# Patient Record
Sex: MALE | Race: BLACK OR AFRICAN AMERICAN | NOT HISPANIC OR LATINO | Employment: FULL TIME | URBAN - METROPOLITAN AREA
[De-identification: names, ages, dates, MRNs, and addresses within clinical notes are randomized per-mention and may not be internally consistent; named-entity substitution may affect disease eponyms.]

---

## 2019-06-13 ENCOUNTER — RECORDS - HEALTHEAST (OUTPATIENT)
Dept: LAB | Facility: HOSPITAL | Age: 39
End: 2019-06-13

## 2019-06-13 LAB — HBV SURFACE AB SERPL IA-ACNC: POSITIVE M[IU]/ML

## 2019-06-17 LAB
GAMMA INTERFERON BACKGROUND BLD IA-ACNC: 0.94 IU/ML
M TB IFN-G BLD-IMP: POSITIVE
MITOGEN IGNF BCKGRD COR BLD-ACNC: 6.35 IU/ML
MITOGEN IGNF BCKGRD COR BLD-ACNC: 6.36 IU/ML
QTF INTERPRETATION: ABNORMAL
QTF MITOGEN - NIL: 6.77 IU/ML

## 2020-08-27 ENCOUNTER — OFFICE VISIT - HEALTHEAST (OUTPATIENT)
Dept: FAMILY MEDICINE | Facility: CLINIC | Age: 40
End: 2020-08-27

## 2020-08-27 ENCOUNTER — COMMUNICATION - HEALTHEAST (OUTPATIENT)
Dept: FAMILY MEDICINE | Facility: CLINIC | Age: 40
End: 2020-08-27

## 2020-08-27 DIAGNOSIS — M54.50 CHRONIC MIDLINE LOW BACK PAIN, UNSPECIFIED WHETHER SCIATICA PRESENT: ICD-10-CM

## 2020-08-27 DIAGNOSIS — R07.9 CHEST PAIN, UNSPECIFIED TYPE: ICD-10-CM

## 2020-08-27 DIAGNOSIS — G89.29 CHRONIC MIDLINE LOW BACK PAIN, UNSPECIFIED WHETHER SCIATICA PRESENT: ICD-10-CM

## 2020-08-27 DIAGNOSIS — R53.83 FATIGUE, UNSPECIFIED TYPE: ICD-10-CM

## 2020-08-27 DIAGNOSIS — K92.1 BLOOD IN STOOL: ICD-10-CM

## 2020-08-27 DIAGNOSIS — H40.9 GLAUCOMA OF BOTH EYES, UNSPECIFIED GLAUCOMA TYPE: ICD-10-CM

## 2020-08-27 DIAGNOSIS — H40.003 GLAUCOMA SUSPECT, BILATERAL: ICD-10-CM

## 2020-08-27 DIAGNOSIS — R11.0 NAUSEA: ICD-10-CM

## 2020-08-27 DIAGNOSIS — G44.219 EPISODIC TENSION-TYPE HEADACHE, NOT INTRACTABLE: ICD-10-CM

## 2020-08-27 LAB
ALBUMIN SERPL-MCNC: 4.3 G/DL (ref 3.5–5)
ALBUMIN UR-MCNC: NEGATIVE MG/DL
ALP SERPL-CCNC: 83 U/L (ref 45–120)
ALT SERPL W P-5'-P-CCNC: 19 U/L (ref 0–45)
ANION GAP SERPL CALCULATED.3IONS-SCNC: 11 MMOL/L (ref 5–18)
APPEARANCE UR: CLEAR
AST SERPL W P-5'-P-CCNC: 24 U/L (ref 0–40)
BILIRUB SERPL-MCNC: 0.8 MG/DL (ref 0–1)
BILIRUB UR QL STRIP: NEGATIVE
BUN SERPL-MCNC: 14 MG/DL (ref 8–22)
CALCIUM SERPL-MCNC: 10.2 MG/DL (ref 8.5–10.5)
CHLORIDE BLD-SCNC: 101 MMOL/L (ref 98–107)
CO2 SERPL-SCNC: 27 MMOL/L (ref 22–31)
COLOR UR AUTO: YELLOW
CREAT SERPL-MCNC: 1.51 MG/DL (ref 0.7–1.3)
ERYTHROCYTE [DISTWIDTH] IN BLOOD BY AUTOMATED COUNT: 12.8 % (ref 11–14.5)
FERRITIN SERPL-MCNC: 163 NG/ML (ref 27–300)
GFR SERPL CREATININE-BSD FRML MDRD: 51 ML/MIN/1.73M2
GLUCOSE BLD-MCNC: 75 MG/DL (ref 70–125)
GLUCOSE UR STRIP-MCNC: NEGATIVE MG/DL
HCT VFR BLD AUTO: 49.2 % (ref 40–54)
HGB BLD-MCNC: 16.4 G/DL (ref 14–18)
HGB UR QL STRIP: NEGATIVE
IRON SATN MFR SERPL: 24 % (ref 20–50)
IRON SERPL-MCNC: 72 UG/DL (ref 42–175)
KETONES UR STRIP-MCNC: NEGATIVE MG/DL
LEUKOCYTE ESTERASE UR QL STRIP: NEGATIVE
LIPASE SERPL-CCNC: 39 U/L (ref 0–52)
MCH RBC QN AUTO: 28.8 PG (ref 27–34)
MCHC RBC AUTO-ENTMCNC: 33.4 G/DL (ref 32–36)
MCV RBC AUTO: 86 FL (ref 80–100)
NITRATE UR QL: NEGATIVE
PH UR STRIP: 6 [PH] (ref 5–8)
PLATELET # BLD AUTO: 134 THOU/UL (ref 140–440)
PMV BLD AUTO: 9.1 FL (ref 7–10)
POTASSIUM BLD-SCNC: 4.3 MMOL/L (ref 3.5–5)
PROT SERPL-MCNC: 8 G/DL (ref 6–8)
RBC # BLD AUTO: 5.69 MILL/UL (ref 4.4–6.2)
SODIUM SERPL-SCNC: 139 MMOL/L (ref 136–145)
SP GR UR STRIP: 1.02 (ref 1–1.03)
TIBC SERPL-MCNC: 305 UG/DL (ref 313–563)
TRANSFERRIN SERPL-MCNC: 244 MG/DL (ref 212–360)
UROBILINOGEN UR STRIP-ACNC: NORMAL
WBC: 5.4 THOU/UL (ref 4–11)

## 2020-08-27 RX ORDER — BRIMONIDINE TARTRATE 2 MG/ML
SOLUTION/ DROPS OPHTHALMIC
Status: SHIPPED | COMMUNITY
Start: 2020-08-08

## 2020-08-27 ASSESSMENT — MIFFLIN-ST. JEOR: SCORE: 1645.61

## 2020-08-27 NOTE — ASSESSMENT & PLAN NOTE
No history of migraines  Ibuprofen and Tylenol typically help  Stress tends to be a trigger    Does not describe pounding or auras  Does not describe photophobia or phonophobia

## 2020-08-27 NOTE — ASSESSMENT & PLAN NOTE
Blood in stool 2 weeks   Constipated   Now better    Fissure in past     Colon 5 years    Hemorrhoidal tag at 10:00  Visualized fissure

## 2020-08-27 NOTE — ASSESSMENT & PLAN NOTE
Last year  Last few minutes  Can exercise     THis last year once    Nausea  Lost weight unintentional No    Ibuprofen no often    Blood in stool 1x    Bikes, walk and runs     Sleeps OK

## 2020-08-28 LAB — HBA1C MFR BLD: 5.4 %

## 2020-08-30 ENCOUNTER — COMMUNICATION - HEALTHEAST (OUTPATIENT)
Dept: FAMILY MEDICINE | Facility: CLINIC | Age: 40
End: 2020-08-30

## 2020-09-09 ENCOUNTER — COMMUNICATION - HEALTHEAST (OUTPATIENT)
Dept: FAMILY MEDICINE | Facility: CLINIC | Age: 40
End: 2020-09-09

## 2021-02-12 ENCOUNTER — COMMUNICATION - HEALTHEAST (OUTPATIENT)
Dept: INTERNAL MEDICINE | Facility: CLINIC | Age: 41
End: 2021-02-12

## 2021-02-16 ENCOUNTER — OFFICE VISIT - HEALTHEAST (OUTPATIENT)
Dept: FAMILY MEDICINE | Facility: CLINIC | Age: 41
End: 2021-02-16

## 2021-02-16 DIAGNOSIS — M25.552 HIP PAIN, LEFT: ICD-10-CM

## 2021-02-16 DIAGNOSIS — S06.0X0D CONCUSSION WITHOUT LOSS OF CONSCIOUSNESS, SUBSEQUENT ENCOUNTER: ICD-10-CM

## 2021-02-16 DIAGNOSIS — G44.319 ACUTE POST-TRAUMATIC HEADACHE, NOT INTRACTABLE: ICD-10-CM

## 2021-03-09 ENCOUNTER — COMMUNICATION - HEALTHEAST (OUTPATIENT)
Dept: SCHEDULING | Facility: CLINIC | Age: 41
End: 2021-03-09

## 2021-03-09 ENCOUNTER — OFFICE VISIT - HEALTHEAST (OUTPATIENT)
Dept: FAMILY MEDICINE | Facility: CLINIC | Age: 41
End: 2021-03-09

## 2021-03-09 DIAGNOSIS — K64.5 THROMBOSED EXTERNAL HEMORRHOIDS: ICD-10-CM

## 2021-05-27 VITALS
HEART RATE: 86 BPM | TEMPERATURE: 98 F | RESPIRATION RATE: 16 BRPM | DIASTOLIC BLOOD PRESSURE: 74 MMHG | OXYGEN SATURATION: 99 % | SYSTOLIC BLOOD PRESSURE: 152 MMHG

## 2021-06-01 ENCOUNTER — OFFICE VISIT - HEALTHEAST (OUTPATIENT)
Dept: FAMILY MEDICINE | Facility: CLINIC | Age: 41
End: 2021-06-01

## 2021-06-01 DIAGNOSIS — R55 SYNCOPE, UNSPECIFIED SYNCOPE TYPE: ICD-10-CM

## 2021-06-01 DIAGNOSIS — R68.89 EXERCISE INTOLERANCE: ICD-10-CM

## 2021-06-01 NOTE — ASSESSMENT & PLAN NOTE
"Driving to work    Fainting yesterday Am   Went to gym  21 min running   Decided to something  Getting off  >> felt nauseated and dizziness   Went down twice   \"did not remember first episode\"    No Vomiting   + Headache >> No history of Migraine       Sat a little   Heart pounding and heart beat fast   Took me home  [place]     Blood pressure >>   Orthostatic 124 / 77  73  98%                     114 / 66  109   Tylenol     Felt better after eating   Speech OK   \"felt cold\"  ++ sweating     This AM better     Felt some pain in shoulder and neck     Plan   On the treadmill >> Feeling Nauseated and Dizziness before     Obtain an echocardiogram to exclude any structural abnormalities  EKG  Follow-up with rapid access for evaluation and treatment as well as blood work        "

## 2021-06-03 ENCOUNTER — OFFICE VISIT - HEALTHEAST (OUTPATIENT)
Dept: CARDIOLOGY | Facility: CLINIC | Age: 41
End: 2021-06-03

## 2021-06-03 DIAGNOSIS — R55 VASOVAGAL SYNCOPE: ICD-10-CM

## 2021-06-03 ASSESSMENT — MIFFLIN-ST. JEOR: SCORE: 1637.67

## 2021-06-04 VITALS
DIASTOLIC BLOOD PRESSURE: 66 MMHG | OXYGEN SATURATION: 99 % | BODY MASS INDEX: 25.76 KG/M2 | HEIGHT: 68 IN | WEIGHT: 170 LBS | SYSTOLIC BLOOD PRESSURE: 118 MMHG | HEART RATE: 84 BPM

## 2021-06-04 LAB
ATRIAL RATE - MUSE: 80 BPM
DIASTOLIC BLOOD PRESSURE - MUSE: NORMAL
INTERPRETATION ECG - MUSE: NORMAL
P AXIS - MUSE: 63 DEGREES
PR INTERVAL - MUSE: 118 MS
QRS DURATION - MUSE: 68 MS
QT - MUSE: 326 MS
QTC - MUSE: 375 MS
R AXIS - MUSE: -1 DEGREES
SYSTOLIC BLOOD PRESSURE - MUSE: NORMAL
T AXIS - MUSE: -4 DEGREES
VENTRICULAR RATE- MUSE: 80 BPM

## 2021-06-05 VITALS
WEIGHT: 169.9 LBS | DIASTOLIC BLOOD PRESSURE: 62 MMHG | TEMPERATURE: 98.7 F | OXYGEN SATURATION: 99 % | BODY MASS INDEX: 26.22 KG/M2 | SYSTOLIC BLOOD PRESSURE: 118 MMHG | HEART RATE: 81 BPM

## 2021-06-10 NOTE — PROGRESS NOTES
ASSESSMENT & PLAN    Blood in stool  Blood in stool 2 weeks   Constipated   Now better    Fissure in past     Colon 5 years    Hemorrhoidal tag at 10:00  Visualized fissure      Chest pain, unspecified type  Last year  Last few minutes  Can exercise     THis last year once    Nausea  Lost weight unintentional No    Ibuprofen no often    Blood in stool 1x    Bikes, walk and runs     Sleeps OK       Glaucoma suspect, bilateral  Bromonidine     Headache  No history of migraines  Ibuprofen and Tylenol typically help  Stress tends to be a trigger    Does not describe pounding or auras  Does not describe photophobia or phonophobia        Chronic midline low back pain, unspecified whether sciatica present  Low back pain  On and off  No radiation  Is occasionally associated with pain in his ankles        Temo was seen today for annual exam.    Diagnoses and all orders for this visit:    Blood in stool  -     Iron and Transferrin Iron Binding Capacity  -     Ferritin  -     HM2(CBC w/o Differential)    Chest pain, unspecified type  -     JIC RED  -     JIC LAV    Nausea  -     Lipase  -     Comprehensive Metabolic Panel  -     Urinalysis-UC if Indicated    Episodic tension-type headache, not intractable  -     JIC RED  -     JIC LAV    Chronic midline low back pain, unspecified whether sciatica present  -     JIC RED  -     JIC LAV    Glaucoma suspect, bilateral    Glaucoma of both eyes, unspecified glaucoma type    Fatigue, unspecified type  -     Glycosylated Hemoglobin A1c        Patient Instructions   CHest Pain INtermitten    Differential Diagnosis   Heart  Aneurysm   GERD  PUD  Muscle   Stress REaction    Could do an echocardiogram with or without stress echocardiogram to evaluate cardiac function, valvular function, for septal hypertrophy or left ventricular hypertrophy    Blood pressure super reassuring rechecked at 100/60    H pylori could be sampled through an endoscopy or through the stool    We will do simple  "blood work today  Kidney testing  Liver testing  General blood count testing  Iron studies  Urinalysis    To specifically evaluate for gallstones that could be associated with nausea or kidney stones a CT scan would be the preferable test    To look for ulcers or sample for H. pylori and endoscopy would be the best way to look    On a CT scan we would also see your lumbar spine to see if he has any significant arthritis      My preference  Stress echocardiogram to evaluate cardiac function and gross structures of the heart  Make a catalog of symptoms and see if things change or evolve over time    If nausea is persistent I would have you see Dr. Mayank Briones at [facility] and do  a trial of over-the-counter Prilosec 20 mg twice daily until seen by Dr. Briones      If headaches are worsening or prominent feature  As always  2 L of water daily  Ensure that you not overdoing caffeine  Do intentional exercise as well as ensure you are getting adequate sleep    If your bowels are constipated  Increase fluid intake 2 L of water daily and add plants  Eat plenty of fruits and vegetables  Increase fiber intake in the form of plants I like 2 tablespoons of Ayan seed, flaxseed, hemp seed is 3 great sources of fiber      Focused Nutrition:  Eat Clean and Whole Foods  These are single ingredient foods that possess vital nutrients which can touch and affect our health in a positive manner.            Lean Meats Protein and Fat---- Nuts, Eggs,Veggies, Fish and Lean Meats especially fish and poultry. Meat and Eggs in their natural form have No Sugar.  Try to choose good fat from Fish, Nuts, Avocados, Extra Virgin Olive Oil (not cooked) other vegetables for example. Opt for Plant Proteins and Fats over Animal Fats      For High Protein Eat---- Meats, Fish, Lean Meats, Beans, Nuts, and Quinoa/ other Whole grains    Focus on \"Whole Foods\":  You know what the food is by looking at it and comes from a plant, tree, animal or " the sea.  Single source organically grown if possible.    Look for Low Glycemic foods:  Try to Avoid foods with any added sugars and absolutely  no High Fructose Corn Syrup    Lean Meats Protein and Fat: These in their natural form have No Sugar.  Go for the good fat from Fish, Nuts, Avocados, Extra Virgin Olive Oil (not cooked) other vegetables for example. Opt for Plant Proteins and Fats over Animal Fats    Beans are excellent complex carbohydrates with fiber- black, garbanzo, lentil, kidney, lima, Steve, Salas    Simple flours and breads, sigh,  in general are simple sugars, when processed and should be avoided.  However there are beneficial fibers in wholes grains.  So if choosing, go for the 100% whole-grain Grains --Bran, brown rice, Flax----Fiber offsets glycemic affect    Vegetables: most vegetables are low glycemic with the exception of starchy ones:  potatoes, carrots, corn, and beats    Whole Fruits mostly are low glycemic:  try to eat his snacks: Try not to pair with fat.  Insulin burns sugar and stores fat as a job     High glycemic fruits: Dates, Watermelon, Figgs, Apricots, Raisins but in moderation OK    For Snacks go for nuts and seeds unless you are instructed not to do so due to another health condition; please although tempting avoid corn chips, jellybeans, pretzels other processed snacks that usually pair sugar and fat.      Probiotics and Prebiotics support digestion and our immune system!    Foods that support this are, among others:    Pros: Kefir, Kombucha, Miso, Pickled Vegetables, Yuridia Kraut, Yogurt, Tempeh,     Pre: Asparagus, Bananas, Eggplant, Garlic, Honey, Kefir, Leeks, Legumes, Onions, Peas, Whole Grains, and Yogurt    Nutrients support our cellular machinery:  High nutrient food support this.    Rest helps digest.  We need restorative sleep  8- 10 hours.  We should also try to have 10 to 12 hours at some point between meals, e.g. 7 PM to 7 AM     Fiber: Ayan Seed or Flax Seed or  "Hemp Seeds:  2-3 tablespoons daily for fiber and Omega 3s      Move everyday your body and sweat!    Get good 8 to 10 hours of Sleep and Hydrate with Water      If your Triglycerides are High:    Look into a Ketogenic Diet    Always choose --No added Sugar..    Fish Oil 2000 mg Daily and/or Flax or Ayan seeds  Milled 2 tablespoons                            Daily    Ayan seed in [place] Milk over night to make pudding    Nuts Especially walnuts.    Fish especially  [place], Mackerel, Anchovy,Sardine and Herring    Vegetables opt for multiple colors daily  New Goal 3- 5 cups of fruits and                         veggies Daily!!      Fermented Foods Rock!  You can do your own preserving and culturing of good bacteria!      Drink fermented Kombuchaa  Eat Cultured vegetable like Kimchi or Sauerkraut  Apple Cider Vinegar Unfiltered can be added 8 oz fizzy water  Foods:  Beets, Celery, Lemon, Lime, Grapefruit, Cucumber and Carrots    Use Bitter Herbs:  Iliana, Arugala, Cilantro, Turmeric, Dandelion, CUmin, Fennel, Mint, Leeks, Parsley, and Milk THistle    Practice Fastin hours from last meal in evening to first meal in morming    Chlorophyll Rich Foods may help, add to water CHorella or Spirulina    Eat Kale and Broccoli Sprouts --- Sulfurophane rich      Eat Cruciferous Vegetables Daily:    Broccoli, Cauliflower, Kale, Collards, Brussel Sprouts    Eat Lots of Fiber:      Soluble:   Ayan, Flax Hemp, Pumpkin Seeds  Insoluble:  Fruits and Veggies  Best sources of  Chicory Root Ground, Dandelion Root, Asparagus, Leeks and Onion, Bananas ( more green), Sprouted Wheat eg Srinivas Bread , Garlic, [place] Artichokes,)       Lastly we have to think of things that are toxic to our health, which may contribute to poor health and disease.  An apple covered in pesticides has both healthy and toxic components for our system.  The very \"healthy choices\" may be laced with toxins.  So choose foods wisely and discriminatingly.  "     Here are some recommendations about when and when not to choose organic foods.  When in doubt wash!      The group identified the following items on its  Dirty Dozen  list of produce with the most pesticide residue:   1. Strawberries  2. Spinach  3. Nectarines  4. Apples  5. Grapes  6. Peaches  7. Cherries  8. Pears  9. Tomatoes  10. Celery  11. Potatoes  12. Sweet Bell Peppers  Here are the items the EWG identified for its  Clean 15,  which report the least likelihood to contain pesticide residue.  1. Avocados  2. Sweet Corn  3. Pineapples  4. Cabbages  5. Onions  6. Sweet Peas  7. Papayas  8. Asparagus  9. Mangoes  10. Eggplants  11. Honeydews  12. Kiwis  13. Cantaloupes  14. Cauliflower  15. Broccoli          Eat well, Get Good Sleep and Stay Active!    DanL            Return in about 1 month (around 9/27/2020).       Little interest or pleasure in doing things: Not at all  Feeling down, depressed, or hopeless: Not at all    CHIEF COMPLAINT: Temo Vickers had concerns including Annual Exam (headaches, dizziness, stomach pain x1 week-constipation, back pain, bloody stools ).    [place]: 1.............. SUBJECTIVE:  Temo Vickers is a 40 y.o. male had concerns including Annual Exam (headaches, dizziness, stomach pain x1 week-constipation, back pain, bloody stools ).    1. Blood in stool    2. Chest pain, unspecified type    3. Nausea    4. Episodic tension-type headache, not intractable    5. Chronic midline low back pain, unspecified whether sciatica present    6. Glaucoma suspect, bilateral    7. Glaucoma of both eyes, unspecified glaucoma type    8. Fatigue, unspecified type          Allergies not on file                      SOCIAL: He  reports that he has never smoked. He has never used smokeless tobacco. He reports previous alcohol use. He reports that he does not use drugs.    REVIEW OF SYSTEMS:   Family history not pertinent to chief complaint or presenting problem    Review of systems otherwise negative  "as requested from patient, except   Those positive ROS outlined and discussed in [place].      VITALS:  Vitals:    08/27/20 1616   BP: 118/66   Pulse: 84   SpO2: 99%   Weight: 170 lb (77.1 kg)   Height: 5' 8\" (1.727 m)     Wt Readings from Last 3 Encounters:   08/27/20 170 lb (77.1 kg)     Body mass index is 25.85 kg/m .    Physical Exam:      Physical:  General Appearance: Healthy-appearingy.   Head:  No deformity  Eyes: Sclerae white, pupils equal and reactive, extra ocular movements intact pterygium     Ears: Well-positioned, well-formed pinnae; TM pearly white, translucent, no bulging   Nose: Clear, normal mucosa no drainage or crusting  Throat: Lips, tongue, and mucosa are moist, pink and intact; tongue no thrush oral pharynx no injection or lesions  ++ TMJ Click   Neck: Supple, symmetric ROM no nodes   No carotid Buits  Chest: Lungs clear to auscultation, no retractions  Heart: Regular rate & rhythm, S1 S2, no murmur  Abdomen: Soft, non-tender, no masses; umbilical area normal   Pulses: Equal femoral pulses  : No hernia palpable external anal mucosa  10:00 skin tag  Declined rectal exam  Extremities: Well-perfused, warm and dry  Palpable Pulses Bilateral straight leg raise is negative  Good strength flexion-extension the ankle flexion-extension the knee  Distal pulses palpable no lower extremity edema  Neuro: Easily aroused good tone NO tremor   Skin  No Rash           I spent 40  minutes with this patient.  This includes pre-visit, intra-visit and post visit work an evaluation with regards to Temo was seen today for annual exam.    Diagnoses and all orders for this visit:    Blood in stool  -     Iron and Transferrin Iron Binding Capacity  -     Ferritin  -     HM2(CBC w/o Differential)    Chest pain, unspecified type  -     JIC RED  -     JIC LAV    Nausea  -     Lipase  -     Comprehensive Metabolic Panel  -     Urinalysis-UC if Indicated    Episodic tension-type headache, not intractable  -     JIC RED  -  "    JIC LAV    Chronic midline low back pain, unspecified whether sciatica present  -     JIC RED  -     JIC LAV    Glaucoma suspect, bilateral    Glaucoma of both eyes, unspecified glaucoma type    Fatigue, unspecified type  -     Glycosylated Hemoglobin A1c        Cullen Rodriguez MD  [facility] [address] [phone]

## 2021-06-10 NOTE — PATIENT INSTRUCTIONS - HE
CHest Pain INtermitten    Differential Diagnosis   Heart  Aneurysm   GERD  PUD  Muscle   Stress REaction    Could do an echocardiogram with or without stress echocardiogram to evaluate cardiac function, valvular function, for septal hypertrophy or left ventricular hypertrophy    Blood pressure super reassuring rechecked at 100/60    H pylori could be sampled through an endoscopy or through the stool    We will do simple blood work today  Kidney testing  Liver testing  General blood count testing  Iron studies  Urinalysis    To specifically evaluate for gallstones that could be associated with nausea Ultrasound is the best test for kidney stones a CT scan would be the preferable test    To look for ulcers or sample for H. pylori and endoscopy would be the best way to look    On a CT scan we would also see your lumbar spine to see if he has any significant arthritis      My preference  Stress echocardiogram to evaluate cardiac function and gross structures of the heart  Make a catalog of symptoms and see if things change or evolve over time    If nausea is persistent I would have you see Dr. Mayank Briones at [facility] and do  a trial of over-the-counter Prilosec 20 mg twice daily until seen by Dr. Briones      If headaches are worsening or prominent feature  As always  2 L of water daily  Ensure that you not overdoing caffeine  Do intentional exercise as well as ensure you are getting adequate sleep    If your bowels are constipated  Increase fluid intake 2 L of water daily and add plants  Eat plenty of fruits and vegetables  Increase fiber intake in the form of plants I like 2 tablespoons of Ayan seed, flaxseed, hemp seed is 3 great sources of fiber      Focused Nutrition:  Eat Clean and Whole Foods  These are single ingredient foods that possess vital nutrients which can touch and affect our health in a positive manner.            Lean Meats Protein and Fat---- Nuts, Eggs,Veggies, Fish and Lean Meats  "especially fish and poultry. Meat and Eggs in their natural form have No Sugar.  Try to choose good fat from Fish, Nuts, Avocados, Extra Virgin Olive Oil (not cooked) other vegetables for example. Opt for Plant Proteins and Fats over Animal Fats      For High Protein Eat---- Meats, Fish, Lean Meats, Beans, Nuts, and Quinoa/ other Whole grains    Focus on \"Whole Foods\":  You know what the food is by looking at it and comes from a plant, tree, animal or the sea.  Single source organically grown if possible.    Look for Low Glycemic foods:  Try to Avoid foods with any added sugars and absolutely  no High Fructose Corn Syrup    Lean Meats Protein and Fat: These in their natural form have No Sugar.  Go for the good fat from Fish, Nuts, Avocados, Extra Virgin Olive Oil (not cooked) other vegetables for example. Opt for Plant Proteins and Fats over Animal Fats    Beans are excellent complex carbohydrates with fiber- black, garbanzo, lentil, kidney, lima, Steve, Salas    Simple flours and breads, sigh,  in general are simple sugars, when processed and should be avoided.  However there are beneficial fibers in wholes grains.  So if choosing, go for the 100% whole-grain Grains --Bran, brown rice, Flax----Fiber offsets glycemic affect    Vegetables: most vegetables are low glycemic with the exception of starchy ones:  potatoes, carrots, corn, and beats    Whole Fruits mostly are low glycemic:  try to eat his snacks: Try not to pair with fat.  Insulin burns sugar and stores fat as a job     High glycemic fruits: Dates, Watermelon, Figgs, Apricots, Raisins but in moderation OK    For Snacks go for nuts and seeds unless you are instructed not to do so due to another health condition; please although tempting avoid corn chips, jellybeans, pretzels other processed snacks that usually pair sugar and fat.      Probiotics and Prebiotics support digestion and our immune system!    Foods that support this are, among others:    Pros: " Kefir, Kombucha, Miso, Pickled Vegetables, Yuridia Kraut, Yogurt, Tempeh,     Pre: Asparagus, Bananas, Eggplant, Garlic, Honey, Kefir, Leeks, Legumes, Onions, Peas, Whole Grains, and Yogurt    Nutrients support our cellular machinery:  High nutrient food support this.    Rest helps digest.  We need restorative sleep  8- 10 hours.  We should also try to have 10 to 12 hours at some point between meals, e.g. 7 PM to 7 AM     Fiber: Ayan Seed or Flax Seed or Hemp Seeds:  2-3 tablespoons daily for fiber and Omega 3s      Move everyday your body and sweat!    Get good 8 to 10 hours of Sleep and Hydrate with Water      If your Triglycerides are High:    Look into a Ketogenic Diet    Always choose --No added Sugar..    Fish Oil 2000 mg Daily and/or Flax or Ayan seeds  Milled 2 tablespoons                            Daily    Ayan seed in [place] Milk over night to make pudding    Nuts Especially walnuts.    Fish especially  [place], Mackerel, Anchovy,Sardine and Herring    Vegetables opt for multiple colors daily  New Goal 3- 5 cups of fruits and                         veggies Daily!!      Fermented Foods Rock!  You can do your own preserving and culturing of good bacteria!      Drink fermented Kombuchaa  Eat Cultured vegetable like Kimchi or Sauerkraut  Apple Cider Vinegar Unfiltered can be added 8 oz fizzy water  Foods:  Beets, Celery, Lemon, Lime, Grapefruit, Cucumber and Carrots    Use Bitter Herbs:  [place], Arugala, Cilantro, Turmeric, Dandelion, CUmin, Fennel, Mint, Leeks, Parsley, and Milk THistle    Practice Fastin hours from last meal in evening to first meal in morming    Chlorophyll Rich Foods may help, add to water CHorella or Spirulina    Eat Kale and Broccoli Sprouts --- Sulfurophane rich      Eat Cruciferous Vegetables Daily:    Broccoli, Cauliflower, Kale, Collards, Brussel Sprouts    Eat Lots of Fiber:      Soluble:   Ayan, Flax Hemp, Pumpkin Seeds  Insoluble:  Fruits and Veggies  Best sources of  Chicory  "Root Ground, Dandelion Root, Asparagus, Leeks and Onion, Bananas ( more green), Sprouted Wheat eg Srinivas Bread , Garlic, [place] Artichokes,)       Lastly we have to think of things that are toxic to our health, which may contribute to poor health and disease.  An apple covered in pesticides has both healthy and toxic components for our system.  The very \"healthy choices\" may be laced with toxins.  So choose foods wisely and discriminatingly.      Here are some recommendations about when and when not to choose organic foods.  When in doubt wash!      The group identified the following items on its  Dirty Dozen  list of produce with the most pesticide residue:   1. Strawberries  2. Spinach  3. Nectarines  4. Apples  5. Grapes  6. Peaches  7. Cherries  8. Pears  9. Tomatoes  10. Celery  11. Potatoes  12. Sweet Bell Peppers  Here are the items the EWG identified for its  Clean 15,  which report the least likelihood to contain pesticide residue.  1. Avocados  2. Sweet Corn  3. Pineapples  4. Cabbages  5. Onions  6. Sweet Peas  7. Papayas  8. Asparagus  9. Mangoes  10. Eggplants  11. Honeydews  12. Kiwis  13. Cantaloupes  14. Cauliflower  15. Broccoli          Eat well, Get Good Sleep and Stay Active!    DanL        "

## 2021-06-15 NOTE — PATIENT INSTRUCTIONS - HE
Continue to monitor symptoms    Let us know if there is any change in your vision, swallowing, worsening headache, balance changes    Continue Tylenol as needed for headaches  Warm compresses to the neck  Gentle stretching and ease back into activities    We could order a CT scan especially if you are having worsening symptoms to exclude bleeding inside of your head    Close follow-up in the next 2 weeks time

## 2021-06-15 NOTE — TELEPHONE ENCOUNTER
"Spouse Stefani calling with patient. He is in a lot pain with hemmoroids.  Patient reports increased pain since yesterday. Stefani reporting 2 bumps around rectum that are the \"size of pointer finger and top of thumb.\"   Patient has tried sitz bath.  Reporting episode of \"fainting\" due to pain at 330 a.m. today. Patient stating he is able to stand and walk with increased pain today.   Advised to be seen in clinic today.    Transferred to Central Scheduling.    Kerri Fung RN  [facility]    COVID 19 Nurse Triage Plan/Patient Instructions    Please be aware that novel coronavirus (COVID-19) may be circulating in the community. If you develop symptoms such as fever, cough, or SOB or if you have concerns about the presence of another infection including coronavirus (COVID-19), please contact your health care provider or visit  [email or web address].    Disposition/Instructions    In-Person Visit with provider recommended. Reference Visit Selection Guide.    Thank you for taking steps to prevent the spread of this virus.  o Limit your contact with others.  o Wear a simple mask to cover your cough.  o Wash your hands well and often.    Resources    [facility]: About COVID-19: [email or web address]    CDC: What to Do If You're Sick: www.cdc.gov/coronavirus/2019-ncov/about/steps-when-sick.html    CDC: Ending Home Isolation: www.cdc.gov/coronavirus/2019-ncov/hcp/disposition-in-home-patients.html     CDC: Caring for Someone: www.cdc.gov/coronavirus/2019-ncov/if-you-are-sick/care-for-someone.html     [facility]: Interim Guidance for Hospital Discharge to Home: www.health.[facility].mn.us/diseases/coronavirus/hcp/hospdischarge.pdf    [facility] clinical trials (COVID-19 research studies): [email or web address]     Below are the COVID-19 hotlines at the [facility] ([facility]). Interpreters are available.   o For health questions: Call " 837.636.9282 or 1-605.749.4836 (7 a.m. to 7 p.m.)  o For questions about schools and childcare: Call 939-786-1556 or 1-764.764.7566 (7 a.m. to 7 p.m.)     Reason for Disposition    Severe rectal pain    Additional Information    Negative: Sounds like a life-threatening emergency to the triager    Negative: Diarrhea is the main symptom    Negative: Constipation is the main symptom (e.g., pain or discomfort caused by passage of hard BMs)    Negative: Blood in or on bowel movement is the main symptom    Negative: Sexual assault    Negative: Injury to rectum    Negative: Patient sounds very sick or weak to the triager    Protocols used: RECTAL SYMPTOMS-A-[place]
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
## 2021-06-15 NOTE — PATIENT INSTRUCTIONS - HE
1) Increase fluids and fiber in your diet. Best done with leafy greens and fruits that have skins. If you are unable to increase fiber with foods, consider a fiber supplement such as Metamucil or Benefiber.    2) Do not sit on the toilet for a long period of time or bear down while trying to have a bowel movement.  3) Apply Anusol to the outside of the hemorrhoid up to 4 times per day to help with inflammation and itch.   4) Over the counter stool softener or laxatives can be used if constipation relief.  5) Use sitz baths 3-4 times per day. Sit in warm water for 15 min at a time.     Understanding Hemorrhoids  Hemorrhoid tissues are  cushions  of blood vessels that swell slightly during bowel movements. Too much pressure on the anal canal can make these tissues remain enlarged and cause symptoms. This can happen both inside and outside the anal canal.    Parts of the Anal Canal    Internal hemorrhoid tissue is in the upper area of the anal canal.    The rectum is the last several inches of the colon. This is where stool is stored prior to bowel movements.    Anal sphincters are ring-shaped muscles that expand and contract to control the anal opening.    External hemorrhoid tissue lies under the anal skin.    The anus is the passage between the rectum and the outside of the body.  Normal Hemorrhoid Tissue  Hemorrhoid tissues play an important role in helping your body eliminate waste. Food passes from the stomach through the intestines. The waste (stool) then travels through the colon to the rectum. It is stored in the rectum until it s ready to be passed from the anus. During bowel movements, hemorrhoids swell with blood and become slightly larger. This swelling helps protect and cushion the anal canal as stool passes from the body. Once the stool has passed, the tissues stop swelling and return to normal.    Problem Hemorrhoids  Pressure due to straining or other factors can cause hemorrhoid tissues to remain  swollen. When this happens to the hemorrhoid tissues in the anal canal they re called internal hemorrhoids. Swollen tissues around the anal opening are called external hemorrhoids. Depending on the location, your symptoms can differ.    Internal hemorrhoids often occur in clusters around the wall of the anal canal. They are usually painless. But they may prolapse (protrude out of the anus) due to straining or pressure from hard stool. After the bowel movement is over, they may then reduce (return inside the body). Internal hemorrhoids often bleed. They can also discharge mucus.    External hemorrhoids are located at the anal opening, just beneath the skin. These tissues rarely cause problems unless they thrombose (form a blood clot). When this occurs, a hard, bluish lump may appear. A thrombosed hemorrhoid also causes sudden, severe pain. In time, the clot may go away on its own. This sometimes leaves a  skin tag  of tissue stretched by the clot.  Hemorrhoid Symptoms  Hemorrhoid symptoms may include:    Pain or a burning sensation    Bleeding during bowel movements    Protrusion of tissue from the anus    Itching around the anus  Causes of Hemorrhoids  There s no single cause of hemorrhoids. Most often, though, they are caused by too much pressure on the anal canal. This can be due to:    Chronic (ongoing) constipation    Straining during bowel movements    Sitting too long on the toilet    Strenuous exercise or heavy lifting   Pregnancy and childbirth    Aging    Diarrhea     7998-2510 [employer]. [address] [place] [address]. All rights reserved. This information is not intended as a substitute for professional medical care. Always follow your healthcare professional's instructions.

## 2021-06-16 PROBLEM — K60.2 ANAL FISSURE: Status: ACTIVE | Noted: 2020-08-27

## 2021-06-16 PROBLEM — K92.1 BLOOD IN STOOL: Status: ACTIVE | Noted: 2020-08-27

## 2021-06-16 PROBLEM — G89.29 CHRONIC MIDLINE LOW BACK PAIN, UNSPECIFIED WHETHER SCIATICA PRESENT: Status: ACTIVE | Noted: 2020-08-27

## 2021-06-16 PROBLEM — S09.90XA INJURY OF HEAD, INITIAL ENCOUNTER: Status: ACTIVE | Noted: 2021-02-12

## 2021-06-16 PROBLEM — H40.003 GLAUCOMA SUSPECT, BILATERAL: Status: ACTIVE | Noted: 2020-08-27

## 2021-06-16 PROBLEM — M54.50 CHRONIC MIDLINE LOW BACK PAIN, UNSPECIFIED WHETHER SCIATICA PRESENT: Status: ACTIVE | Noted: 2020-08-27
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
## 2021-06-16 PROBLEM — R07.9 CHEST PAIN, UNSPECIFIED TYPE: Status: ACTIVE | Noted: 2020-08-27

## 2021-06-20 NOTE — LETTER
Letter by Cullen Rodriguez MD at      Author: Cullen Rodriguez MD Service: -- Author Type: --    Filed:  Encounter Date: 8/30/2020 Status: (Other)         Temo Vickers  [address] [place] [address]             August 30, 2020         Dear Mr. Vickers,    Below are the results from your recent visit:    Resulted Orders   Lipase   Result Value Ref Range    Lipase 39 0 - 52 U/L   Iron and Transferrin Iron Binding Capacity   Result Value Ref Range    Iron 72 42 - 175 ug/dL    Transferrin 244 212 - 360 mg/dL    Transferrin Saturation, Calculated 24 20 - 50 %    Transferrin IBC, Calculated 305 (L) 313 - 563 ug/dL   Ferritin   Result Value Ref Range    Ferritin 163 27 - 300 ng/mL   HM2(CBC w/o Differential)   Result Value Ref Range    WBC 5.4 4.0 - 11.0 thou/uL    RBC 5.69 4.40 - 6.20 mill/uL    Hemoglobin 16.4 14.0 - 18.0 g/dL    Hematocrit 49.2 40.0 - 54.0 %    MCV 86 80 - 100 fL    MCH 28.8 27.0 - 34.0 pg    MCHC 33.4 32.0 - 36.0 g/dL    RDW 12.8 11.0 - 14.5 %    Platelets 134 (L) 140 - 440 thou/uL    MPV 9.1 7.0 - 10.0 fL   Comprehensive Metabolic Panel   Result Value Ref Range    Sodium 139 136 - 145 mmol/L    Potassium 4.3 3.5 - 5.0 mmol/L    Chloride 101 98 - 107 mmol/L    CO2 27 22 - 31 mmol/L    Anion Gap, Calculation 11 5 - 18 mmol/L    Glucose 75 70 - 125 mg/dL    BUN 14 8 - 22 mg/dL    Creatinine 1.51 (H) 0.70 - 1.30 mg/dL    GFR MDRD Af Amer >60 >60 mL/min/1.73m2    GFR MDRD Non Af Amer 51 (L) >60 mL/min/1.73m2    Bilirubin, Total 0.8 0.0 - 1.0 mg/dL    Calcium 10.2 8.5 - 10.5 mg/dL    Protein, Total 8.0 6.0 - 8.0 g/dL    Albumin 4.3 3.5 - 5.0 g/dL    Alkaline Phosphatase 83 45 - 120 U/L    AST 24 0 - 40 U/L    ALT 19 0 - 45 U/L    Narrative    Fasting Glucose reference range is 70-99 mg/dL per  American Diabetes Association (ADA) guidelines.   Urinalysis-UC if Indicated   Result Value Ref Range    Color, UA Yellow Colorless, Yellow, Straw, Light Yellow    Clarity, UA Clear Clear    Glucose, UA  Negative Negative    Bilirubin, UA Negative Negative    Ketones, UA Negative Negative    Specific Gravity, UA 1.020 1.005 - 1.030    Blood, UA Negative Negative    pH, UA 6.0 5.0 - 8.0    Protein, UA Negative Negative mg/dL    Urobilinogen, UA 0.2 E.U./dL 0.2 E.U./dL, 1.0 E.U./dL    Nitrite, UA Negative Negative    Leukocytes, UA Negative Negative    Narrative    Microscopic not indicated  UC not indicated   Glycosylated Hemoglobin A1c   Result Value Ref Range    Hemoglobin A1c 5.4 <=5.6 %      Comment:      Normal <5.7% Prediabete 5.7-6.4% Diabletes 6.5% or higher - adopted from ADA consensus guidelines       Temo and Stefani that Labs look really OK       Creatinine is 1.5 but may just be more muscle mass turnover.   Intentional Water intake.       Lipase = Pancreas OK   LIver AST/ALT = OK     Iron Levels = OK   NO diabetes   Urine is Spotless  = No evidence of Kidney Stones   CBC = Blood Counts OK    Next Steps   Either:  Heart Test Stress Echo would be the test of Choice = Functional and Structural     Gastrointesinal Ulcer Screen   Stool Test for Parasites and H Pylori  CT scan Checks for Kidneys and Consitpation and General Cancer screen   Trial of Prilosec OTC 20 mg two times a day for 14 days and see if helps   DanL      Please call with questions or contact us using [employer].    Sincerely,        Electronically signed by Cullen Rodriguez MD

## 2021-06-20 NOTE — LETTER
Letter by Cullen Rodriguez MD at      Author: Cullen Rodriguez MD Service: -- Author Type: --    Filed:  Encounter Date: 9/9/2020 Status: (Other)         Temo Vickers  [address] [place] [address]             September 9, 2020         Dear Mr. Vickers,    Below are the results from your recent visit:    Resulted Orders   Lipase   Result Value Ref Range    Lipase 39 0 - 52 U/L   Iron and Transferrin Iron Binding Capacity   Result Value Ref Range    Iron 72 42 - 175 ug/dL    Transferrin 244 212 - 360 mg/dL    Transferrin Saturation, Calculated 24 20 - 50 %    Transferrin IBC, Calculated 305 (L) 313 - 563 ug/dL   Ferritin   Result Value Ref Range    Ferritin 163 27 - 300 ng/mL   HM2(CBC w/o Differential)   Result Value Ref Range    WBC 5.4 4.0 - 11.0 thou/uL    RBC 5.69 4.40 - 6.20 mill/uL    Hemoglobin 16.4 14.0 - 18.0 g/dL    Hematocrit 49.2 40.0 - 54.0 %    MCV 86 80 - 100 fL    MCH 28.8 27.0 - 34.0 pg    MCHC 33.4 32.0 - 36.0 g/dL    RDW 12.8 11.0 - 14.5 %    Platelets 134 (L) 140 - 440 thou/uL    MPV 9.1 7.0 - 10.0 fL   Comprehensive Metabolic Panel   Result Value Ref Range    Sodium 139 136 - 145 mmol/L    Potassium 4.3 3.5 - 5.0 mmol/L    Chloride 101 98 - 107 mmol/L    CO2 27 22 - 31 mmol/L    Anion Gap, Calculation 11 5 - 18 mmol/L    Glucose 75 70 - 125 mg/dL    BUN 14 8 - 22 mg/dL    Creatinine 1.51 (H) 0.70 - 1.30 mg/dL    GFR MDRD Af Amer >60 >60 mL/min/1.73m2    GFR MDRD Non Af Amer 51 (L) >60 mL/min/1.73m2    Bilirubin, Total 0.8 0.0 - 1.0 mg/dL    Calcium 10.2 8.5 - 10.5 mg/dL    Protein, Total 8.0 6.0 - 8.0 g/dL    Albumin 4.3 3.5 - 5.0 g/dL    Alkaline Phosphatase 83 45 - 120 U/L    AST 24 0 - 40 U/L    ALT 19 0 - 45 U/L    Narrative    Fasting Glucose reference range is 70-99 mg/dL per  American Diabetes Association (ADA) guidelines.   Urinalysis-UC if Indicated   Result Value Ref Range    Color, UA Yellow Colorless, Yellow, Straw, Light Yellow    Clarity, UA Clear Clear    Glucose, UA  Negative Negative    Bilirubin, UA Negative Negative    Ketones, UA Negative Negative    Specific Gravity, UA 1.020 1.005 - 1.030    Blood, UA Negative Negative    pH, UA 6.0 5.0 - 8.0    Protein, UA Negative Negative mg/dL    Urobilinogen, UA 0.2 E.U./dL 0.2 E.U./dL, 1.0 E.U./dL    Nitrite, UA Negative Negative    Leukocytes, UA Negative Negative    Narrative    Microscopic not indicated  UC not indicated   Glycosylated Hemoglobin A1c   Result Value Ref Range    Hemoglobin A1c 5.4 <=5.6 %      Comment:      Normal <5.7% Prediabete 5.7-6.4% Diabletes 6.5% or higher - adopted from ADA consensus guidelines       Inform Temo and Stefani that Labs look really OK     Creatinine is 1.5 but may just be more muscle mass turnover.   Intentional Water intake.     Lipase = Pancreas OK   LIver AST/ALT = OK     Iron Levels = OK   NO diabetes   Urine is Spotless  = No evidence of Kidney Stones   CBC = Blood Counts OK     Please call with questions or contact us using [facility].    Sincerely,        Electronically signed by Cullen Rodriguez MD

## 2021-06-20 NOTE — LETTER
Letter by Cullen Rodriguez MD at      Author: Cullen Rodriguez MD Service: -- Author Type: --    Filed:  Encounter Date: 8/27/2020 Status: (Other)         August 27, 2020     Patient: Temo Vickers   YOB: 1980   Date of Visit: 8/27/2020       Triston Plascencia!!    It was really great to meet you today!    Topics:    CHest Pain INtermitten    Differential Diagnosis   Heart  Aneurysm   GERD  PUD  Muscle   Stress REaction    We could do an echocardiogram with or without stress echocardiogram to evaluate cardiac function, valvular function, for septal hypertrophy or left ventricular hypertrophy    Blood pressure super reassuring rechecked at 100/60    H pylori could be sampled through an endoscopy or through the stool--- you bring in Poop.  Parasite testing the same, although diarrhea, bloating and abdominal pain are usually prominent.    We will do simple blood work today  Kidney testing  Liver testing  General blood count testing  Iron studies  Urinalysis  Diabetes Testing      To specifically evaluate for gallstones that could be associated with nausea Ultrasound is the best test  for kidney stones a CT scan would be the preferable test    To look for ulcers or sample for H. pylori and endoscopy would be the best way to look    On a CT scan we would also see your lumbar spine to see if he has any significant arthritis      My preference  Stress echocardiogram to evaluate cardiac function and gross structures of the heart  Make a catalog of symptoms and see if things change or evolve over time    If nausea is persistent I would have you see Dr. Mayank Briones at [facility] and do  a trial of over-the-counter Prilosec 20 mg twice daily until seen by Dr. Briones      If headaches are worsening or prominent feature  As always  2 L of water daily  Ensure that you not overdoing caffeine  Do intentional exercise as well as ensure you are getting adequate sleep    If your bowels are constipated  Increase  "fluid intake 2 L of water daily and add plants  Eat plenty of fruits and vegetables  Increase fiber intake in the form of plants I like 2 tablespoons of Ayan seed, flaxseed, hemp seed is 3 great sources of fiber      Focused Nutrition:  Eat Clean and Whole Foods  These are single ingredient foods that possess vital nutrients which can touch and affect our health in a positive manner.            Lean Meats Protein and Fat---- Nuts, Eggs,Veggies, Fish and Lean Meats especially fish and poultry. Meat and Eggs in their natural form have No Sugar.  Try to choose good fat from Fish, Nuts, Avocados, Extra Virgin Olive Oil (not cooked) other vegetables for example. Opt for Plant Proteins and Fats over Animal Fats      For High Protein Eat---- Meats, Fish, Lean Meats, Beans, Nuts, and Quinoa/ other Whole grains    Focus on \"Whole Foods\":  You know what the food is by looking at it and comes from a plant, tree, animal or the sea.  Single source organically grown if possible.    Look for Low Glycemic foods:  Try to Avoid foods with any added sugars and absolutely  no High Fructose Corn Syrup    Lean Meats Protein and Fat: These in their natural form have No Sugar.  Go for the good fat from Fish, Nuts, Avocados, Extra Virgin Olive Oil (not cooked) other vegetables for example. Opt for Plant Proteins and Fats over Animal Fats    Beans are excellent complex carbohydrates with fiber- black, garbanzo, lentil, kidney, lima, Steve, Salas    Simple flours and breads, sigh,  in general are simple sugars, when processed and should be avoided.  However there are beneficial fibers in wholes grains.  So if choosing, go for the 100% whole-grain Grains --Bran, brown rice, Flax----Fiber offsets glycemic affect    Vegetables: most vegetables are low glycemic with the exception of starchy ones:  potatoes, carrots, corn, and beats    Whole Fruits mostly are low glycemic:  try to eat his snacks: Try not to pair with fat.  Insulin burns sugar and " stores fat as a job     High glycemic fruits: Dates, Watermelon, Figgs, Apricots, Raisins but in moderation OK    For Snacks go for nuts and seeds unless you are instructed not to do so due to another health condition; please although tempting avoid corn chips, jellybeans, pretzels other processed snacks that usually pair sugar and fat.      Probiotics and Prebiotics support digestion and our immune system!    Foods that support this are, among others:    Pros: Kefir, Kombucha, Miso, Pickled Vegetables, Yuridia Kraut, Yogurt, Tempeh,     Pre: Asparagus, Bananas, Eggplant, Garlic, Honey, Kefir, Leeks, Legumes, Onions, Peas, Whole Grains, and Yogurt    Nutrients support our cellular machinery:  High nutrient food support this.    Rest helps digest.  We need restorative sleep  8- 10 hours.  We should also try to have 10 to 12 hours at some point between meals, e.g. 7 PM to 7 AM     Fiber: Ayan Seed or Flax Seed or Hemp Seeds:  2-3 tablespoons daily for fiber and Omega 3s      Move everyday your body and sweat!    Get good 8 to 10 hours of Sleep and Hydrate with Water      If your Triglycerides are High:    Look into a Ketogenic Diet    Always choose --No added Sugar..    Fish Oil 2000 mg Daily and/or Flax or Ayan seeds  Milled 2 tablespoons                            Daily    Ayan seed in Hemlock Milk over night to make pudding    Nuts Especially walnuts.    Fish especially  Vancleave, Mackerel, Anchovy,Sardine and Herring    Vegetables opt for multiple colors daily  New Goal 3- 5 cups of fruits and                         veggies Daily!!      Fermented Foods Rock!  You can do your own preserving and culturing of good bacteria!      Drink fermented Kombuchaa  Eat Cultured vegetable like Kimchi or Sauerkraut  Apple Cider Vinegar Unfiltered can be added 8 oz fizzy water  Foods:  Beets, Celery, Lemon, Lime, Grapefruit, Cucumber and Carrots    Use Bitter Herbs:  Iliana, Arugala, Cilantro, Turmeric, Dandelion, CUmin, Fennel,  "Mint, Leeks, Parsley, and Milk THistle    Practice Fastin hours from last meal in evening to first meal in morming    Chlorophyll Rich Foods may help, add to water CHorella or Spirulina    Eat Kale and Broccoli Sprouts --- Sulfurophane rich      Eat Cruciferous Vegetables Daily:    Broccoli, Cauliflower, Kale, Collards, Brussel Sprouts    Eat Lots of Fiber:      Soluble:   Ayan, Flax Hemp, Pumpkin Seeds  Insoluble:  Fruits and Veggies  Best sources of  Chicory Root Ground, Dandelion Root, Asparagus, Leeks and Onion, Bananas ( more green), Sprouted Wheat eg Srinivas Bread , Garlic, Yorkshire Artichokes,)       Lastly we have to think of things that are toxic to our health, which may contribute to poor health and disease.  An apple covered in pesticides has both healthy and toxic components for our system.  The very \"healthy choices\" may be laced with toxins.  So choose foods wisely and discriminatingly.      Here are some recommendations about when and when not to choose organic foods.  When in doubt wash!      The group identified the following items on its Dirty Dozen list of produce with the most pesticide residue:   1. Strawberries  2. Spinach  3. Nectarines  4. Apples  5. Grapes  6. Peaches  7. Cherries  8. Pears  9. Tomatoes  10. Celery  11. Potatoes  12. Sweet Bell Peppers  Here are the items the EWG identified for its Clean 15, which report the least likelihood to contain pesticide residue.  1. Avocados  2. Sweet Corn  3. Pineapples  4. Cabbages  5. Onions  6. Sweet Peas  7. Papayas  8. Asparagus  9. Mangoes  10. Eggplants  11. Honeydews  12. Kiwis  13. Cantaloupes  14. Cauliflower  15. Broccoli          Eat well, Get Good Sleep and Stay Active!    DanL        Sincerely,         Electronically signed by Cullen Rodriguez MD       "

## 2021-06-21 NOTE — LETTER
Letter by Lucia Tracy PA-C at      Author: Lucia Tracy PA-C Service: -- Author Type: --    Filed:  Encounter Date: 3/9/2021 Status: (Other)         March 9, 2021     Patient: Temo Vickers   YOB: 1980   Date of Visit: 3/9/2021       To Whom It May Concern:    It is my medical opinion that Temo Vickers may return to work on 3/12/2021.    If you have any questions or concerns, please don't hesitate to call.    Sincerely,        Electronically signed by Lucia Tracy PA-C

## 2021-06-25 NOTE — PROGRESS NOTES
"Temo Vickers is a 41 y.o. male who is being evaluated via a billable telephone visit.      What phone number would you like to be contacted at? [phone]  How would you like to obtain your AVS? AVS Preference: Mitesh.    Assessment & Plan   Problem List Items Addressed This Visit     Syncope - Primary     Driving to work    Fainting yesterday Am   Went to gym  21 min running   Decided to something  Getting off  >> felt nauseated and dizziness   Went down twice   \"did not remember first episode\"    No Vomiting   + Headache >> No history of Migraine       Sat a little   Heart pounding and heart beat fast   Took me home  Stefani     Blood pressure >>   Orthostatic 124 / 77  73  98%                     114 / 66  109   Tylenol     Felt better after eating   Speech OK   \"felt cold\"  ++ sweating     This AM better     Felt some pain in shoulder and neck     Plan   On the treadmill >> Feeling Nauseated and Dizziness before     Obtain an echocardiogram to exclude any structural abnormalities  EKG  Follow-up with rapid access for evaluation and treatment as well as blood work               Relevant Orders    Echo Complete    Electrocardiogram Perform and Read (Completed)    Ambulatory referral to [facility]      Other Visit Diagnoses     Exercise intolerance        Relevant Orders    Echo Complete    Electrocardiogram Perform and Read (Completed)    Ambulatory referral to [facility]             Problem List Items Addressed This Visit     Syncope - Primary     Driving to work    Fainting yesterday Am   Went to gym  21 min running   Decided to something  Getting off  >> felt nauseated and dizziness   Went down twice   \"did not remember first episode\"    No Vomiting   + Headache >> No history of Migraine       Sat a little   Heart pounding and heart beat fast   Took me home  Stefani     Blood pressure >>   Orthostatic 124 / 77  73  98%                     114 / 66  109   Tylenol     Felt better after eating " "  Speech OK   \"felt cold\"  ++ sweating     This AM better     Felt some pain in shoulder and neck     Plan   On the treadmill >> Feeling Nauseated and Dizziness before     Obtain an echocardiogram to exclude any structural abnormalities  EKG  Follow-up with rapid access for evaluation and treatment as well as blood work               Relevant Orders    Echo Complete    Electrocardiogram Perform and Read (Completed)    Ambulatory referral to [facility]      Other Visit Diagnoses     Exercise intolerance        Relevant Orders    Echo Complete    Electrocardiogram Perform and Read (Completed)    Ambulatory referral to [facility]            BMI:   Estimated body mass index is 26.22 kg/m  as calculated from the following:    Height as of 2/12/21: 5' 7.5\" (1.715 m).    Weight as of 2/16/21: 169 lb 14.4 oz (77.1 kg).       No follow-ups on file.    Cullen Rodriguez MD  [facility]      Subjective   Temo Vickers is 41 y.o. and presents today for the following health issues   HPI       Review of Systems  Syncope    Nausea   Headache       Objective    Vitals - Patient Reported  Systolic (Patient Reported): 105  Diastolic (Patient Reported): 68  SpO2 (Patient Reported): 98    Physical Exam  none    Phone call duration: 21 minutes    "
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
## 2021-06-26 NOTE — PATIENT INSTRUCTIONS - HE
Thanks for calling in today Temo    It sounds like you had a fainting episode after exercise  This could be related to an abnormal reaction of the sympathetic nervous system  However it is important to rule out any structural abnormalities of your heart and also electrical abnormality of the heart    I would like you to come in and do an EKG  I would like you to have an echocardiogram, which is a structural evaluation of your heart  I would like you to see one of our cardiologists in the rapid access clinic    If these other tests are normal, they may want to put you on a treadmill and to observe what happens in the action of exercise    Please take it light over the next couple of days until you see the cardiologist    If you have any further symptoms the neck step would be to proceed to the emergency room      Genaro WEST

## 2021-06-27 ENCOUNTER — HEALTH MAINTENANCE LETTER (OUTPATIENT)
Age: 41
End: 2021-06-27

## 2021-06-29 ENCOUNTER — HOSPITAL ENCOUNTER (OUTPATIENT)
Dept: CARDIOLOGY | Facility: HOSPITAL | Age: 41
Discharge: HOME | End: 2021-06-29
Attending: INTERNAL MEDICINE
Payer: COMMERCIAL

## 2021-06-29 ENCOUNTER — HOSPITAL ENCOUNTER (OUTPATIENT)
Dept: CARDIOLOGY | Facility: HOSPITAL | Age: 41
Discharge: HOME | End: 2021-06-29
Attending: FAMILY MEDICINE
Payer: COMMERCIAL

## 2021-06-29 DIAGNOSIS — R68.89 EXERCISE INTOLERANCE: ICD-10-CM

## 2021-06-29 DIAGNOSIS — R55 SYNCOPE, UNSPECIFIED SYNCOPE TYPE: ICD-10-CM

## 2021-06-29 DIAGNOSIS — R55 VASOVAGAL SYNCOPE: ICD-10-CM

## 2021-06-29 LAB
AORTIC ROOT: 3.3 CM
AORTIC VALVE MEAN VELOCITY: 85.8 CM/S
ASCENDING AORTA: 3.2 CM
AV DIMENSIONLESS INDEX VTI: 0.9
AV MEAN GRADIENT: 3 MMHG
AV PEAK GRADIENT: 6.7 MMHG
AV VALVE AREA: 2.7 CM2
AV VELOCITY RATIO: 0.8
BSA FOR ECHO PROCEDURE: 1.91 M2
CV BLOOD PRESSURE: ABNORMAL MMHG
CV ECHO HEIGHT: 67.5 IN
CV ECHO WEIGHT: 170 LBS
DOP CALC AO PEAK VEL: 129 CM/S
DOP CALC AO VTI: 25.1 CM
DOP CALC LVOT AREA: 3.14 CM2
DOP CALC LVOT DIAMETER: 2 CM
DOP CALC LVOT PEAK VEL: 109 CM/S
DOP CALC LVOT STROKE VOLUME: 68.8 CM3
DOP CALCLVOT PEAK VEL VTI: 21.9 CM
EJECTION FRACTION: 59 % (ref 55–75)
FRACTIONAL SHORTENING: 30.8 % (ref 28–44)
INTERVENTRICULAR SEPTUM IN END DIASTOLE: 1 CM (ref 0.6–1)
IVS/PW RATIO: 1.3
LA AREA 1: 16.1 CM2
LA AREA 2: 17.1 CM2
LEFT ATRIUM LENGTH: 4.96 CM
LEFT ATRIUM SIZE: 2.8 CM
LEFT ATRIUM TO AORTIC ROOT RATIO: 0.85 NO UNITS
LEFT ATRIUM VOLUME INDEX: 24.7 ML/M2
LEFT ATRIUM VOLUME: 47.2 ML
LEFT VENTRICLE DIASTOLIC VOLUME INDEX: 42.9 CM3/M2 (ref 34–74)
LEFT VENTRICLE DIASTOLIC VOLUME: 82 CM3 (ref 62–150)
LEFT VENTRICLE MASS INDEX: 55.1 G/M2
LEFT VENTRICLE SYSTOLIC VOLUME INDEX: 17.8 CM3/M2 (ref 11–31)
LEFT VENTRICLE SYSTOLIC VOLUME: 34 CM3 (ref 21–61)
LEFT VENTRICULAR INTERNAL DIMENSION IN DIASTOLE: 3.9 CM (ref 4.2–5.8)
LEFT VENTRICULAR INTERNAL DIMENSION IN SYSTOLE: 2.7 CM (ref 2.5–4)
LEFT VENTRICULAR MASS: 105.3 G
LEFT VENTRICULAR OUTFLOW TRACT MEAN GRADIENT: 2 MMHG
LEFT VENTRICULAR OUTFLOW TRACT MEAN VELOCITY: 71.7 CM/S
LEFT VENTRICULAR POSTERIOR WALL IN END DIASTOLE: 0.8 CM (ref 0.6–1)
LV STROKE VOLUME INDEX: 36 ML/M2
MITRAL VALVE E/A RATIO: 1.4
MV AVERAGE E/E' RATIO: 5.3 CM/S
MV DECELERATION TIME: 261 MS
MV E'TISSUE VEL-LAT: 14.3 CM/S
MV E'TISSUE VEL-MED: 8.97 CM/S
MV LATERAL E/E' RATIO: 4.3
MV MEDIAL E/E' RATIO: 6.9
MV PEAK A VELOCITY: 44.4 CM/S
MV PEAK E VELOCITY: 62.2 CM/S
NUC REST DIASTOLIC VOLUME INDEX: 2720 LBS
NUC REST SYSTOLIC VOLUME INDEX: 67.5 IN
TRICUSPID VALVE ANULAR PLANE SYSTOLIC EXCURSION: 2 CM

## 2021-06-29 ASSESSMENT — MIFFLIN-ST. JEOR: SCORE: 1637.67

## 2021-07-04 NOTE — ADDENDUM NOTE
Addendum Note by Kanu Noel MD at 6/3/2021  9:50 AM     Author: Kanu Noel MD Service: -- Author Type: Physician    Filed: 6/3/2021 11:00 AM Encounter Date: 6/3/2021 Status: Signed    : Kanu Noel MD (Physician)    Addended by: KANU NOEL on: 6/3/2021 11:00 AM        Modules accepted: Orders

## 2021-07-06 VITALS
BODY MASS INDEX: 25.76 KG/M2 | HEART RATE: 84 BPM | SYSTOLIC BLOOD PRESSURE: 96 MMHG | DIASTOLIC BLOOD PRESSURE: 56 MMHG | HEIGHT: 68 IN | WEIGHT: 170 LBS | RESPIRATION RATE: 16 BRPM

## 2021-07-06 VITALS — HEIGHT: 68 IN | BODY MASS INDEX: 25.76 KG/M2 | WEIGHT: 170 LBS

## 2021-10-17 ENCOUNTER — HEALTH MAINTENANCE LETTER (OUTPATIENT)
Age: 41
End: 2021-10-17

## 2021-10-21 ENCOUNTER — IMMUNIZATION (OUTPATIENT)
Dept: FAMILY MEDICINE | Facility: CLINIC | Age: 41
End: 2021-10-21
Payer: COMMERCIAL

## 2021-10-21 PROCEDURE — 90471 IMMUNIZATION ADMIN: CPT

## 2021-10-21 PROCEDURE — 90686 IIV4 VACC NO PRSV 0.5 ML IM: CPT

## 2021-12-08 ENCOUNTER — IMMUNIZATION (OUTPATIENT)
Dept: NURSING | Facility: CLINIC | Age: 41
End: 2021-12-08
Payer: COMMERCIAL

## 2021-12-08 PROCEDURE — 91306 COVID-19,PF,MODERNA (18+ YRS BOOSTER .25ML): CPT

## 2021-12-08 PROCEDURE — 0064A COVID-19,PF,MODERNA (18+ YRS BOOSTER .25ML): CPT

## 2021-12-10 DIAGNOSIS — Z20.9 EXPOSURE TO POTENTIAL INFECTION: ICD-10-CM

## 2021-12-10 DIAGNOSIS — Z20.822 EXPOSURE TO 2019 NOVEL CORONAVIRUS: Primary | ICD-10-CM

## 2021-12-10 PROCEDURE — 90691 TYPHOID VACCINE IM: CPT | Performed by: FAMILY MEDICINE

## 2021-12-10 RX ORDER — MEFLOQUINE HYDROCHLORIDE 250 MG/1
250 TABLET ORAL
Qty: 7 TABLET | Refills: 0 | Status: SHIPPED | OUTPATIENT
Start: 2021-12-10 | End: 2022-01-22

## 2021-12-13 ENCOUNTER — NURSE TRIAGE (OUTPATIENT)
Dept: NURSING | Facility: CLINIC | Age: 41
End: 2021-12-13
Payer: COMMERCIAL

## 2021-12-13 NOTE — TELEPHONE ENCOUNTER
Patient is calling and states that he just received covid vaccine the booster of Moderna and is wanting to know if he can get a Tetanus shot and Hepatitis A vaccine and is wondering how long he should wait.  Patient is suppose to get vaccinated tomorrow with tetanus and Hep A.  Patient is requesting to speak with Cullen Espinoza.  Please phone Temo today within 2 hours at [phone].  Patient is traveling Internationally Jan 4th to Jan 26.      COVID 19 Nurse Triage Plan/Patient Instructions    Please be aware that novel coronavirus (COVID-19) may be circulating in the community. If you develop symptoms such as fever, cough, or SOB or if you have concerns about the presence of another infection including coronavirus (COVID-19), please contact your health care provider or visit [email or web address].     Disposition/Instructions    Home care recommended. Follow home care protocol based instructions.    Thank you for taking steps to prevent the spread of this virus.  o Limit your contact with others.  o Wear a simple mask to cover your cough.  o Wash your hands well and often.    Resources    [facility]: About COVID-19: [email or web address]    CDC: What to Do If You're Sick: www.cdc.gov/coronavirus/2019-ncov/about/steps-when-sick.html    CDC: Ending Home Isolation: www.cdc.gov/coronavirus/2019-ncov/hcp/disposition-in-home-patients.html     CDC: Caring for Someone: www.cdc.gov/coronavirus/2019-ncov/if-you-are-sick/care-for-someone.html     [facility]: Interim Guidance for Hospital Discharge to Home: www.health.[facility].mn.us/diseases/coronavirus/hcp/hospdischarge.pdf    [facility] clinical trials (COVID-19 research studies): [email or web address]/[facility]-clinical-trials     Below are the COVID-19 hotlines at the [place] Department of Health ([facility]). Interpreters are available.   o For health questions: Call 692-349-8091 or 1-396.985.6142 (7 a.m. to 7 p.m.)  o For questions about  schools and childcare: Call 466-455-2482 or 1-731.857.6935 (7 a.m. to 7 p.m.)

## 2021-12-18 PROCEDURE — 90471 IMMUNIZATION ADMIN: CPT | Performed by: FAMILY MEDICINE

## 2021-12-18 PROCEDURE — 90691 TYPHOID VACCINE IM: CPT | Performed by: FAMILY MEDICINE

## 2021-12-28 ENCOUNTER — ALLIED HEALTH/NURSE VISIT (OUTPATIENT)
Dept: FAMILY MEDICINE | Facility: CLINIC | Age: 41
End: 2021-12-28
Payer: COMMERCIAL

## 2021-12-28 DIAGNOSIS — Z23 IMMUNIZATION DUE: Primary | ICD-10-CM

## 2021-12-28 PROCEDURE — 99207 PR NO CHARGE LOS: CPT | Performed by: FAMILY MEDICINE

## 2021-12-28 PROCEDURE — 90632 HEPA VACCINE ADULT IM: CPT | Performed by: FAMILY MEDICINE

## 2021-12-28 PROCEDURE — 90471 IMMUNIZATION ADMIN: CPT | Performed by: FAMILY MEDICINE

## 2022-03-31 ENCOUNTER — OFFICE VISIT (OUTPATIENT)
Dept: FAMILY MEDICINE | Facility: CLINIC | Age: 42
End: 2022-03-31
Payer: COMMERCIAL

## 2022-03-31 VITALS
OXYGEN SATURATION: 99 % | HEART RATE: 75 BPM | DIASTOLIC BLOOD PRESSURE: 60 MMHG | HEIGHT: 68 IN | WEIGHT: 173.44 LBS | BODY MASS INDEX: 26.29 KG/M2 | SYSTOLIC BLOOD PRESSURE: 104 MMHG | TEMPERATURE: 98.3 F

## 2022-03-31 DIAGNOSIS — M75.81 RIGHT ROTATOR CUFF TENDONITIS: Primary | ICD-10-CM

## 2022-03-31 DIAGNOSIS — Z13.220 SCREENING FOR HYPERLIPIDEMIA: ICD-10-CM

## 2022-03-31 PROCEDURE — 99213 OFFICE O/P EST LOW 20 MIN: CPT | Performed by: FAMILY MEDICINE

## 2022-03-31 NOTE — PROGRESS NOTES
ASSESSMENT and plan   1. Screening for HIV (human immunodeficiency virus)    He will address this at his next visit    2. Need for hepatitis C screening test  Patient is not interested in a hep C test today    3. Screening for hyperlipidemia    He is not fasting so we will do this at his annual physical    4. Right rotator cuff tendonitis    We discussed treatment options he would like to go to physical therapy he will try continuing with an ice pack.  He does not want to use any systemic medications and would prefer a gel/suggested using Biofreeze.  If his pain increases he understands he needs to contact me  - Physical Therapy Referral; Future        Patient Instructions   It was a pleasure to meet you today    You have a problem with your right rotator cuff around the deltoid muscle.  I am referring you to physical therapy    I would also like you to take Biofreeze which is an over-the-counter preparation it works very well for muscular pain you can use this 2-3 times per day      If the Biofreeze or ice does not work and the physical therapy is making your pain worse please reach out to me and I can prescribe a medication for you      Patient Education     Understanding Rotator Cuff Tendonitis    The rotator cuff is a group of 4 muscles and tendons in the shoulder. Tendons are tough tissues that connect muscles to bone. The 4 muscles and their tendons form a  cuff  around the head of the upper arm bone. The rotator cuff connects the upper arm to the shoulder blade. It keeps the shoulder joint stable and gives it strength. It also helps the shoulder joint with certain movements. These include reaching the arm over the head and rotating the arm.  If tendons are injured or strained, they may get irritated and swollen (inflamed). This is called tendonitis. Rotator cuff tendonitis may cause shoulder pain. It may make it hard to move your shoulder.  What causes rotator cuff tendonitis?  When the rotator cuff tendons  are injured or overworked it causes tendonitis. The most common cause of injury is repetitive overhead activities. These can be work-related activities such as reaching, pushing, or lifting. Or they can be sports-related activities such as throwing, swimming, or lifting weights.  Symptoms of rotator cuff tendonitis  Pain on the side of the upper arm at the shoulder is the most common symptom. Pain may get worse with overhead movements. Or it can get worse when you raise the arm above shoulder level. It may also hurt to lie on the shoulder at night.  Treatment for rotator cuff tendonitis  Treatment may include:    Active rest. This lets the rotator cuff heal. Active rest means using your arm and shoulder, but not doing activities that cause pain. These might be reaching overhead or sleeping on the shoulder.    Cold packs. Putting ice packs on the shoulder helps reduce swelling and ease pain.    Medicines. Prescription or over-the-counter medicines can help ease pain and swelling. NSAIDs (nonsteroidal anti-inflammatory drugs) are the most common medicines used. They may be taken as pills. Or they may be put on the skin as a gel, cream, or patch.    Arm and shoulder exercises. These help keep the shoulder joint moving as it heals. They also help improve the strength of muscles around the joint.  Possible complications  You may be tempted to stop using your shoulder completely to prevent pain. But doing so may lead to a condition called frozen shoulder. To help prevent this, follow instructions you are given for active rest and for doing exercises to help your shoulder heal.  When to call your healthcare provider  Call your healthcare provider right away if you have any of these:    Fever of 100.4 F (38 C) or higher, or as advised by your healthcare provider    Chills    Symptoms that don t get better, or get worse    New symptoms  Ryan last reviewed this educational content on 6/1/2019 2000-2021 [employer]  [employer]. All rights reserved. This information is not intended as a substitute for professional medical care. Always follow your healthcare professional's instructions.               Orders Placed This Encounter   Procedures     Physical Therapy Referral     There are no discontinued medications.    Return in about 6 months (around 9/30/2022) for Routine preventive.    CHIEF COMPLAINT:  chief complaint right shoulder discomfort for 3 months    HISTORY OF PRESENT ILLNESS:  Temo is a 41 year old male who is here because he has had right shoulder pain for about 3 months the pain started gradually he noticed when he was doing any heavy lifting or exercise that he have a twinge of pain on the top of his right shoulder there was no radiation of pain he denies any injuries.  He stopped his exercise protocol and just runs now.  He notes that when he does any weight lifting or overhand pulling he has a discomfort he cannot sleep on the right side.  He is never had any injuries to the right shoulder before.  Denies any numbness to the area.  He is right-hand dominant but denies any weakness to the right forearm.    REVIEW OF SYSTEMS:     Other than mentioned in HPI 10 point review of  All other systems are negative.    PFSH:    Social history reviewed he works for [employer]    TOBACCO USE:  History   Smoking Status     Never Smoker   Smokeless Tobacco     Never Used       VITALS:  Vitals:    03/31/22 0914   BP: 104/60   Pulse: 75   Temp: 98.3  F (36.8  C)   TempSrc: Oral   SpO2: 99%   Weight: 78.7 kg (173 lb 7 oz)   Height: 1.715 m (5' 7.5\")     Wt Readings from Last 3 Encounters:   03/31/22 78.7 kg (173 lb 7 oz)   06/03/21 77.1 kg (170 lb)   02/16/21 77.1 kg (169 lb 14.4 oz)       PHYSICAL EXAM:    Interactive well-built male sitting comfortably in exam room no acute distress  HEENT neck supple mucous members moist with no lymph enlargement noted in the neck  Musculoskeletal system tenderness elicited on the anterior " portion of the right deltoid muscle.  He has no tenderness over the right rhomboid, trapezius or suprascapular muscle.  Neer's impingement test is positive.  He has no discomfort with cardinal movements of the shoulder including external/internal rotation and circumduction.  Neuro power of his right trapezius, serratus interior, biceps triceps and deltoid muscle is 5 out of 5.    DATA REVIEWED:  Additional History from Old Records Summarized (2): 0  Decision to Obtain Records (1): 0  Radiology Tests Summarized or Ordered (1): 0  Labs Reviewed or Ordered (1): 0  Medicine Test Summarized or Ordered (1): 0  Independent Review of EKG or X-RAY(2 each): 0    The visit lasted a total of 21 minutes .    MEDICATIONS:  Current Outpatient Medications   Medication Sig Dispense Refill     brimonidine (ALPHAGAN) 0.2 % ophthalmic solution [BRIMONIDINE (ALPHAGAN) 0.2 % OPHTHALMIC SOLUTION]

## 2022-03-31 NOTE — PATIENT INSTRUCTIONS
It was a pleasure to meet you today    You have a problem with your right rotator cuff around the deltoid muscle.  I am referring you to physical therapy    I would also like you to take Biofreeze which is an over-the-counter preparation it works very well for muscular pain you can use this 2-3 times per day      If the Biofreeze or ice does not work and the physical therapy is making your pain worse please reach out to me and I can prescribe a medication for you      Patient Education     Understanding Rotator Cuff Tendonitis    The rotator cuff is a group of 4 muscles and tendons in the shoulder. Tendons are tough tissues that connect muscles to bone. The 4 muscles and their tendons form a  cuff  around the head of the upper arm bone. The rotator cuff connects the upper arm to the shoulder blade. It keeps the shoulder joint stable and gives it strength. It also helps the shoulder joint with certain movements. These include reaching the arm over the head and rotating the arm.  If tendons are injured or strained, they may get irritated and swollen (inflamed). This is called tendonitis. Rotator cuff tendonitis may cause shoulder pain. It may make it hard to move your shoulder.  What causes rotator cuff tendonitis?  When the rotator cuff tendons are injured or overworked it causes tendonitis. The most common cause of injury is repetitive overhead activities. These can be work-related activities such as reaching, pushing, or lifting. Or they can be sports-related activities such as throwing, swimming, or lifting weights.  Symptoms of rotator cuff tendonitis  Pain on the side of the upper arm at the shoulder is the most common symptom. Pain may get worse with overhead movements. Or it can get worse when you raise the arm above shoulder level. It may also hurt to lie on the shoulder at night.  Treatment for rotator cuff tendonitis  Treatment may include:    Active rest. This lets the rotator cuff heal. Active rest means  using your arm and shoulder, but not doing activities that cause pain. These might be reaching overhead or sleeping on the shoulder.    Cold packs. Putting ice packs on the shoulder helps reduce swelling and ease pain.    Medicines. Prescription or over-the-counter medicines can help ease pain and swelling. NSAIDs (nonsteroidal anti-inflammatory drugs) are the most common medicines used. They may be taken as pills. Or they may be put on the skin as a gel, cream, or patch.    Arm and shoulder exercises. These help keep the shoulder joint moving as it heals. They also help improve the strength of muscles around the joint.  Possible complications  You may be tempted to stop using your shoulder completely to prevent pain. But doing so may lead to a condition called frozen shoulder. To help prevent this, follow instructions you are given for active rest and for doing exercises to help your shoulder heal.  When to call your healthcare provider  Call your healthcare provider right away if you have any of these:    Fever of 100.4 F (38 C) or higher, or as advised by your healthcare provider    Chills    Symptoms that don t get better, or get worse    New symptoms  Ryan last reviewed this educational content on 6/1/2019 2000-2021 [employer]. All rights reserved. This information is not intended as a substitute for professional medical care. Always follow your healthcare professional's instructions.

## 2022-04-01 ENCOUNTER — MYC MEDICAL ADVICE (OUTPATIENT)
Dept: FAMILY MEDICINE | Facility: CLINIC | Age: 42
End: 2022-04-01
Payer: COMMERCIAL

## 2022-10-02 ENCOUNTER — HEALTH MAINTENANCE LETTER (OUTPATIENT)
Age: 42
End: 2022-10-02

## 2023-01-17 ENCOUNTER — LAB REQUISITION (OUTPATIENT)
Dept: LAB | Facility: CLINIC | Age: 43
End: 2023-01-17
Payer: COMMERCIAL

## 2023-01-17 DIAGNOSIS — R30.0 DYSURIA: ICD-10-CM

## 2023-01-17 DIAGNOSIS — Z13.1 ENCOUNTER FOR SCREENING FOR DIABETES MELLITUS: ICD-10-CM

## 2023-01-17 DIAGNOSIS — Z12.5 ENCOUNTER FOR SCREENING FOR MALIGNANT NEOPLASM OF PROSTATE: ICD-10-CM

## 2023-01-17 DIAGNOSIS — Z13.220 ENCOUNTER FOR SCREENING FOR LIPOID DISORDERS: ICD-10-CM

## 2023-01-17 PROCEDURE — 80053 COMPREHEN METABOLIC PANEL: CPT | Mod: ORL | Performed by: FAMILY MEDICINE

## 2023-01-17 PROCEDURE — 80061 LIPID PANEL: CPT | Mod: ORL | Performed by: FAMILY MEDICINE

## 2023-01-17 PROCEDURE — G0103 PSA SCREENING: HCPCS | Mod: ORL | Performed by: FAMILY MEDICINE

## 2023-01-17 PROCEDURE — 87086 URINE CULTURE/COLONY COUNT: CPT | Mod: ORL | Performed by: FAMILY MEDICINE

## 2023-01-18 ENCOUNTER — TRANSCRIBE ORDERS (OUTPATIENT)
Dept: OTHER | Age: 43
End: 2023-01-18

## 2023-01-18 DIAGNOSIS — M25.511 PAIN IN RIGHT SHOULDER: Primary | ICD-10-CM

## 2023-01-18 LAB
ALBUMIN SERPL BCG-MCNC: 4.7 G/DL (ref 3.5–5.2)
ALP SERPL-CCNC: 78 U/L (ref 40–129)
ALT SERPL W P-5'-P-CCNC: 33 U/L (ref 10–50)
ANION GAP SERPL CALCULATED.3IONS-SCNC: 18 MMOL/L (ref 7–15)
AST SERPL W P-5'-P-CCNC: 50 U/L (ref 10–50)
BILIRUB SERPL-MCNC: 0.6 MG/DL
BUN SERPL-MCNC: 12.7 MG/DL (ref 6–20)
CALCIUM SERPL-MCNC: 10.5 MG/DL (ref 8.6–10)
CHLORIDE SERPL-SCNC: 99 MMOL/L (ref 98–107)
CHOLEST SERPL-MCNC: 203 MG/DL
CREAT SERPL-MCNC: 1.21 MG/DL (ref 0.67–1.17)
DEPRECATED HCO3 PLAS-SCNC: 23 MMOL/L (ref 22–29)
GFR SERPL CREATININE-BSD FRML MDRD: 76 ML/MIN/1.73M2
GLUCOSE SERPL-MCNC: 84 MG/DL (ref 70–99)
HDLC SERPL-MCNC: 62 MG/DL
LDLC SERPL CALC-MCNC: 131 MG/DL
NONHDLC SERPL-MCNC: 141 MG/DL
POTASSIUM SERPL-SCNC: 4.3 MMOL/L (ref 3.4–5.3)
PROT SERPL-MCNC: 7.2 G/DL (ref 6.4–8.3)
PSA SERPL-MCNC: 1.61 NG/ML (ref 0–2.5)
SODIUM SERPL-SCNC: 140 MMOL/L (ref 136–145)
TRIGL SERPL-MCNC: 50 MG/DL

## 2023-01-19 LAB — BACTERIA UR CULT: NO GROWTH

## 2023-02-03 ENCOUNTER — THERAPY VISIT (OUTPATIENT)
Dept: PHYSICAL THERAPY | Facility: CLINIC | Age: 43
End: 2023-02-03
Attending: FAMILY MEDICINE
Payer: COMMERCIAL

## 2023-02-03 DIAGNOSIS — M25.511 PAIN IN RIGHT SHOULDER: ICD-10-CM

## 2023-02-03 PROCEDURE — 97161 PT EVAL LOW COMPLEX 20 MIN: CPT | Mod: GP | Performed by: PHYSICAL THERAPIST

## 2023-02-03 PROCEDURE — 97110 THERAPEUTIC EXERCISES: CPT | Mod: GP | Performed by: PHYSICAL THERAPIST

## 2023-02-03 NOTE — PROGRESS NOTES
KEY PT FINDINGS:  1) Intermittent, episodic shoulder pain  2) Limited thoracic ROM, reduced muscular flexibility  3) Poor posterior shoulder activation    Physical Therapy Initial Evaluation: Subjective History     Injury/Condition Details:  Presenting Complaint Right shoulder pain, right hand dominant   Onset Timing/Date December 2021   Mechanism Unknown injury, no mechanism, no trauma recalled.     Woke up with the morning with pain one day. Doesn't know what he did to his shoulder.     Has tried exercises and stretching, painful, ice packs are helpful.      Symptom Behavior Details    Primary Symptoms Sporadic symptoms; Activity/position dependent, pain (Location: anterior shoulder mostly, can be top of shoulder or posterior, Quality: Aching/Throbbing), stiffness  No mechanical symptoms.    Worst Pain 6/10 (with See below)   Symptom Provocators Pushing, pulling occasionally. It isn't consistent as he can do work outs sometimes and sometimes it can cause pain.     No pain with lifting, putting on jackets or laying on his side. Can do all his normal activities.    Best Pain 0/10    Symptom Relievers Ice   Time of day dependent? No   Recent symptom change? symptoms improving     Prior Testing/Intervention for current condition:  Prior Tests None   Prior Treatment none     Lifestyle & General Medical History:  Employment NST/NA at [employer]   Usual physical activities  (within past year) Work outs 3-4x/day   Orthopaedic history See Epic Chart   Notable medical history See Epic Chart   Patient Reported Health good         Physical Therapy Initial Evaluation: Objective Testing  SHOULDER:    Cervical Screen: Normal, no shoulder provocation    Posture: Forward shoulder, forward head. Well developed anterior shoulder musculature    Shoulder: full motion, lacking end range FE on right compared to left Will measure 90/90 next visit   ROM L ROM R MMT/Resisted Testing L MMT/Resisted Testing R   Flexion    -    Abduction    +   IR    +   ER    -   Mid Trap MMT: 4/5  Low Trap MMT: 4-/5 - difficulty raising arm up, (lack of thoracic extension + lack of lower trap strength)      Special Tests:   L R   Impingement     Neer's  -   Hawkin's-Go  +   Posterior/Internal Impingement     AC Joint     Shear     [place]'s (ACJ/superficial pain)     Labral     [place]'s (deep pain)     Instability     Apprehension-Relocation (anterior)     Anterior Load & Shift     Posterior Load & Shift     Multi-Directional Instability      Sulcus     Biceps      Speed's     Rotator Cuff Tear     Drop Arm  -   Lift off   -     Lack of thoracic rotation noted bilaterally, lack of thoracic extension noted.     Assessment/Plan:  Temo presents to physical therapy with complaints of right shoulder pain without trauma, without injury. Primary findings include lack of scapular strength as well as lack of thoracic rotation. He had pain with resisted RC testing into abduction and internal rotation. No gross loss of glenohumeral joint range of motion noted. He reports working out several times per day, all apparently anterior upper body. Recommended modifications to his current exercise plan to improve symptoms as well as implemented physical therapy program.     Patient is a 42 year old male with right side shoulder complaints.    Patient has the following significant findings with corresponding treatment plan.                Diagnosis 1:  Right shoulder pain  Pain -  hot/cold therapy, manual therapy, self management and education  Decreased ROM/flexibility - manual therapy, therapeutic exercise and home program  Decreased joint mobility - manual therapy, therapeutic exercise and home program  Decreased strength - therapeutic exercise, therapeutic activities and home program  Impaired muscle performance - neuro re-education and home program  Decreased function - therapeutic activities and home program    Therapy Evaluation Codes:   1) History comprised  of:   Personal factors that impact the plan of care:      None.    Comorbidity factors that impact the plan of care are:      None.     Medications impacting care: None.  2) Examination of Body Systems comprised of:   Body structures and functions that impact the plan of care:      Shoulder.   Activity limitations that impact the plan of care are:      Lifting.  3) Clinical presentation characteristics are:   Stable/Uncomplicated.  4) Decision-Making    Low complexity using standardized patient assessment instrument and/or measureable assessment of functional outcome.  Cumulative Therapy Evaluation is: Low complexity.    Previous and current functional limitations:  (See Goal Flow Sheet for this information)    Short term and Long term goals: (See Goal Flow Sheet for this information)     Communication ability:  Patient appears to be able to clearly communicate and understand verbal and written communication and follow directions correctly.  Treatment Explanation - The following has been discussed with the patient:   RX ordered/plan of care  Anticipated outcomes  Possible risks and side effects  This patient would benefit from PT intervention to resume normal activities.   Rehab potential is good.    Frequency:  1 X week, once daily  Duration:  for 6 weeks  Discharge Plan:  Achieve all LTG.  Independent in home treatment program.  Reach maximal therapeutic benefit.    Please refer to the daily flowsheet for treatment today, total treatment time and time spent performing 1:1 timed codes.

## 2023-02-03 NOTE — LETTER
[facility]  [address] [place] [address]  [phone]    2023    Re: Temo Vickers   :   1980  MRN:  [id number]   REFERRING PHYSICIAN:   Cullen CONROY [facility]  Date of Initial Evaluation: 2/3/23  Visits:  Rxs Used: 1  Reason for Referral:  Pain in right shoulder    EVALUATION SUMMARY    KEY PT FINDINGS:  1) Intermittent, episodic shoulder pain  2) Limited thoracic ROM, reduced muscular flexibility  3) Poor posterior shoulder activation    Physical Therapy Initial Evaluation: Subjective History     Injury/Condition Details:  Presenting Complaint Right shoulder pain, right hand dominant   Onset Timing/Date 2021   Mechanism Unknown injury, no mechanism, no trauma recalled.     Woke up with the morning with pain one day. Doesn't know what he did to his shoulder.     Has tried exercises and stretching, painful, ice packs are helpful.      Symptom Behavior Details    Primary Symptoms Sporadic symptoms; Activity/position dependent, pain (Location: anterior shoulder mostly, can be top of shoulder or posterior, Quality: Aching/Throbbing), stiffness  No mechanical symptoms.    Worst Pain 6/10 (with See below)   Symptom Provocators Pushing, pulling occasionally. It isn't consistent as he can do work outs sometimes and sometimes it can cause pain.     No pain with lifting, putting on jackets or laying on his side. Can do all his normal activities.    Best Pain 0/10    Symptom Relievers Ice   Time of day dependent? No   Recent symptom change? symptoms improving         Re: Temo Vickers   :   1980    Prior Testing/Intervention for current condition:  Prior Tests None   Prior Treatment none     Lifestyle & General Medical History:  Employment NST/NA at [employer]   Usual physical activities  (within past year) Work outs 3-4x/day   Orthopaedic history See Epic Chart    Notable medical history See Epic Chart   Patient Reported Health good       Physical Therapy Initial Evaluation: Objective Testing  SHOULDER:    Cervical Screen: Normal, no shoulder provocation    Posture: Forward shoulder, forward head. Well developed anterior shoulder musculature    Shoulder: full motion, lacking end range FE on right compared to left Will measure 90/90 next visit   ROM L ROM R MMT/Resisted Testing L MMT/Resisted Testing R   Flexion    -   Abduction    +   IR    +   ER    -   Mid Trap MMT: 4/5  Low Trap MMT: 4-/5 - difficulty raising arm up, (lack of thoracic extension + lack of lower trap strength)      Special Tests:   L R   Impingement     Neer's  -   Hawkin's-Go  +   Posterior/Internal Impingement     AC Joint     Shear     [place]'s (ACJ/superficial pain)     Labral     [place]'s (deep pain)     Instability     Apprehension-Relocation (anterior)     Anterior Load & Shift     Posterior Load & Shift     Multi-Directional Instability      Sulcus  Re: Temo Maradiagamameghan   :   1980       Biceps      Speed's     Rotator Cuff Tear     Drop Arm  -   Lift off   -     Lack of thoracic rotation noted bilaterally, lack of thoracic extension noted.     Assessment/Plan:  Temo presents to physical therapy with complaints of right shoulder pain without trauma, without injury. Primary findings include lack of scapular strength as well as lack of thoracic rotation. He had pain with resisted RC testing into abduction and internal rotation. No gross loss of glenohumeral joint range of motion noted. He reports working out several times per day, all apparently anterior upper body. Recommended modifications to his current exercise plan to improve symptoms as well as implemented physical therapy program.     Patient is a 42 year old male with right side shoulder complaints.    Patient has the following significant findings with corresponding treatment plan.                Diagnosis 1:  Right shoulder pain   Pain -  hot/cold therapy, manual therapy, self management and education  Decreased ROM/flexibility - manual therapy, therapeutic exercise and home program  Decreased joint mobility - manual therapy, therapeutic exercise and home program  Decreased strength - therapeutic exercise, therapeutic activities and home program  Impaired muscle performance - neuro re-education and home program  Decreased function - therapeutic activities and home program    Therapy Evaluation Codes:   1) History comprised of:   Personal factors that impact the plan of care:      None.    Comorbidity factors that impact the plan of care are:      None.     Medications impacting care: None.  2) Examination of Body Systems comprised of:   Body structures and functions that impact the plan of care:      Shoulder.   Activity limitations that impact the plan of care are:      Lifting.  3) Clinical presentation characteristics are:   Stable/Uncomplicated.  4) Decision-Making    Low complexity using standardized patient assessment instrument and/or measureable assessment of functional outcome.  Cumulative Therapy Evaluation is: Low complexity.    Previous and current functional limitations:  (See Goal Flow Sheet for this information)    Short term and Long term goals: (See Goal Flow Sheet for this information)     Communication ability:  Patient appears to be able to clearly communicate and   Re: Temo Vickers   :   1980    understand verbal and written communication and follow directions correctly.  Treatment Explanation - The following has been discussed with the patient:   RX ordered/plan of care  Anticipated outcomes  Possible risks and side effects  This patient would benefit from PT intervention to resume normal activities.   Rehab potential is good.    Frequency:  1 X week, once daily  Duration:  for 6 weeks  Discharge Plan:  Achieve all LTG.  Independent in home treatment program.  Reach maximal therapeutic benefit.    Thank you for  your referral.    INQUIRIES  Therapist: Coty Yeung, PT, DPT, OCS   [facility] [address] [place] [address]  Phone: [phone]  Fax: [phone]

## 2023-02-11 ENCOUNTER — HEALTH MAINTENANCE LETTER (OUTPATIENT)
Age: 43
End: 2023-02-11

## 2023-02-13 ENCOUNTER — THERAPY VISIT (OUTPATIENT)
Dept: PHYSICAL THERAPY | Facility: CLINIC | Age: 43
End: 2023-02-13
Payer: COMMERCIAL

## 2023-02-13 DIAGNOSIS — M25.511 RIGHT SHOULDER PAIN, UNSPECIFIED CHRONICITY: Primary | ICD-10-CM

## 2023-02-13 PROCEDURE — 97112 NEUROMUSCULAR REEDUCATION: CPT | Mod: GP | Performed by: PHYSICAL THERAPIST

## 2023-02-13 PROCEDURE — 97110 THERAPEUTIC EXERCISES: CPT | Mod: GP | Performed by: PHYSICAL THERAPIST

## 2023-03-06 ENCOUNTER — THERAPY VISIT (OUTPATIENT)
Dept: PHYSICAL THERAPY | Facility: CLINIC | Age: 43
End: 2023-03-06
Payer: COMMERCIAL

## 2023-03-06 DIAGNOSIS — M25.511 RIGHT SHOULDER PAIN, UNSPECIFIED CHRONICITY: Primary | ICD-10-CM

## 2023-03-06 PROCEDURE — 97112 NEUROMUSCULAR REEDUCATION: CPT | Mod: 59 | Performed by: PHYSICAL THERAPIST

## 2023-03-06 PROCEDURE — 97110 THERAPEUTIC EXERCISES: CPT | Mod: 59 | Performed by: PHYSICAL THERAPIST

## 2023-03-28 ENCOUNTER — THERAPY VISIT (OUTPATIENT)
Dept: PHYSICAL THERAPY | Facility: CLINIC | Age: 43
End: 2023-03-28
Payer: COMMERCIAL

## 2023-03-28 DIAGNOSIS — M25.511 RIGHT SHOULDER PAIN, UNSPECIFIED CHRONICITY: Primary | ICD-10-CM

## 2023-03-28 PROCEDURE — 97110 THERAPEUTIC EXERCISES: CPT | Mod: GP | Performed by: PHYSICAL THERAPIST

## 2023-03-28 PROCEDURE — 97530 THERAPEUTIC ACTIVITIES: CPT | Mod: GP | Performed by: PHYSICAL THERAPIST

## 2024-03-09 ENCOUNTER — HEALTH MAINTENANCE LETTER (OUTPATIENT)
Age: 44
End: 2024-03-09

## 2024-03-29 ENCOUNTER — LAB REQUISITION (OUTPATIENT)
Dept: LAB | Facility: CLINIC | Age: 44
End: 2024-03-29
Payer: COMMERCIAL

## 2024-03-29 DIAGNOSIS — L30.9 DERMATITIS, UNSPECIFIED: ICD-10-CM

## 2024-03-29 PROCEDURE — 87101 SKIN FUNGI CULTURE: CPT | Mod: ORL | Performed by: FAMILY MEDICINE

## 2024-04-27 LAB — BACTERIA SPEC CULT: NO GROWTH

## 2025-03-16 ENCOUNTER — HEALTH MAINTENANCE LETTER (OUTPATIENT)
Age: 45
End: 2025-03-16